# Patient Record
Sex: FEMALE | Race: WHITE | Employment: FULL TIME | URBAN - METROPOLITAN AREA
[De-identification: names, ages, dates, MRNs, and addresses within clinical notes are randomized per-mention and may not be internally consistent; named-entity substitution may affect disease eponyms.]

---

## 2021-02-09 ENCOUNTER — HOSPITAL ENCOUNTER (EMERGENCY)
Age: 31
Discharge: HOME | End: 2021-02-09
Attending: EMERGENCY MEDICINE
Payer: COMMERCIAL

## 2021-02-09 ENCOUNTER — APPOINTMENT (OUTPATIENT)
Dept: CT IMAGING | Age: 31
End: 2021-02-09
Payer: COMMERCIAL

## 2021-02-09 VITALS
HEART RATE: 84 BPM | BODY MASS INDEX: 37.57 KG/M2 | WEIGHT: 199 LBS | SYSTOLIC BLOOD PRESSURE: 125 MMHG | RESPIRATION RATE: 14 BRPM | DIASTOLIC BLOOD PRESSURE: 78 MMHG | TEMPERATURE: 98.5 F | OXYGEN SATURATION: 99 % | HEIGHT: 61 IN

## 2021-02-09 DIAGNOSIS — M51.16 LUMBAR DISC HERNIATION WITH RADICULOPATHY: Primary | ICD-10-CM

## 2021-02-09 PROCEDURE — 6360000002 HC RX W HCPCS: Performed by: EMERGENCY MEDICINE

## 2021-02-09 PROCEDURE — 72131 CT LUMBAR SPINE W/O DYE: CPT

## 2021-02-09 PROCEDURE — 6370000000 HC RX 637 (ALT 250 FOR IP): Performed by: EMERGENCY MEDICINE

## 2021-02-09 PROCEDURE — 96372 THER/PROPH/DIAG INJ SC/IM: CPT

## 2021-02-09 PROCEDURE — 99285 EMERGENCY DEPT VISIT HI MDM: CPT

## 2021-02-09 RX ORDER — ONDANSETRON 2 MG/ML
4 INJECTION INTRAMUSCULAR; INTRAVENOUS ONCE
Status: COMPLETED | OUTPATIENT
Start: 2021-02-09 | End: 2021-02-09

## 2021-02-09 RX ORDER — ALBUTEROL SULFATE 90 UG/1
2 AEROSOL, METERED RESPIRATORY (INHALATION) EVERY 6 HOURS PRN
COMMUNITY
Start: 2020-02-19

## 2021-02-09 RX ORDER — CYCLOBENZAPRINE HCL 10 MG
10 TABLET ORAL ONCE
Status: COMPLETED | OUTPATIENT
Start: 2021-02-09 | End: 2021-02-09

## 2021-02-09 RX ORDER — CYCLOBENZAPRINE HCL 10 MG
10 TABLET ORAL 2 TIMES DAILY PRN
Qty: 20 TABLET | Refills: 0 | Status: SHIPPED | OUTPATIENT
Start: 2021-02-09 | End: 2021-02-19

## 2021-02-09 RX ORDER — METHYLPREDNISOLONE 4 MG/1
TABLET ORAL
Qty: 1 KIT | Refills: 0 | Status: SHIPPED | OUTPATIENT
Start: 2021-02-09

## 2021-02-09 RX ORDER — OXYCODONE HYDROCHLORIDE AND ACETAMINOPHEN 5; 325 MG/1; MG/1
1 TABLET ORAL ONCE
Status: COMPLETED | OUTPATIENT
Start: 2021-02-09 | End: 2021-02-09

## 2021-02-09 RX ORDER — NORGESTIMATE AND ETHINYL ESTRADIOL 7DAYSX3 28
KIT ORAL
COMMUNITY
Start: 2021-02-02

## 2021-02-09 RX ORDER — BUPROPION HYDROCHLORIDE 150 MG/1
TABLET ORAL
COMMUNITY
Start: 2020-03-17

## 2021-02-09 RX ORDER — NAPROXEN 500 MG/1
500 TABLET ORAL 2 TIMES DAILY
Qty: 20 TABLET | Refills: 0 | Status: SHIPPED | OUTPATIENT
Start: 2021-02-09 | End: 2021-02-19

## 2021-02-09 RX ADMIN — ONDANSETRON 4 MG: 2 INJECTION INTRAMUSCULAR; INTRAVENOUS at 13:50

## 2021-02-09 RX ADMIN — HYDROMORPHONE HYDROCHLORIDE 1 MG: 1 INJECTION, SOLUTION INTRAMUSCULAR; INTRAVENOUS; SUBCUTANEOUS at 13:52

## 2021-02-09 RX ADMIN — OXYCODONE AND ACETAMINOPHEN 1 TABLET: 5; 325 TABLET ORAL at 10:57

## 2021-02-09 RX ADMIN — CYCLOBENZAPRINE 10 MG: 10 TABLET, FILM COATED ORAL at 10:57

## 2021-02-09 SDOH — HEALTH STABILITY: MENTAL HEALTH: HOW OFTEN DO YOU HAVE A DRINK CONTAINING ALCOHOL?: NEVER

## 2021-02-09 ASSESSMENT — PAIN DESCRIPTION - LOCATION
LOCATION: BACK

## 2021-02-09 ASSESSMENT — PAIN SCALES - GENERAL
PAINLEVEL_OUTOF10: 7
PAINLEVEL_OUTOF10: 6

## 2021-02-09 ASSESSMENT — PAIN DESCRIPTION - PAIN TYPE
TYPE: ACUTE PAIN
TYPE: ACUTE PAIN

## 2021-02-09 ASSESSMENT — PAIN DESCRIPTION - ORIENTATION: ORIENTATION: LOWER

## 2021-02-09 ASSESSMENT — PAIN - FUNCTIONAL ASSESSMENT: PAIN_FUNCTIONAL_ASSESSMENT: 0-10

## 2021-02-09 NOTE — ED PROVIDER NOTES
eMERGENCY dEPARTMENT eNCOUnter      Pt Name: Rahul Escamilla  MRN: [id number]  [place] 1990  Date of evaluation: 2/9/2021  Provider: Darshana Beck MD     [address] [place] [address]       Chief Complaint   Patient presents with    Back Pain     acute and chronic back pain. Acute started today, coughed and injured back. Did take a bath and was walking to room and passed out, fell onto a recliner. Denies hitting head. Brought in by [facility], was given Fentanyl 100 mcg. HISTORY OF PRESENT ILLNESS   (Location/Symptom, Timing/Onset,Context/Setting, Quality, Duration, Modifying Factors, Severity) Note limiting factors. HPI    Rahul Escamilla is a 35 y.o. female who presents to the emergency department with low back pain. Patient states she has chronic low back pain but today while getting ready for work she coughed and strained her low back and collapsed into the couch. Patient unable to move without increasing pain. Patient did call 911. Patient was given 900 mcg of fentanyl prior to arrival.  Patient is laying on a backboard. Patient denies any numbness. The pain does radiate to the left leg when movement. Does have good sensation of the lower extremities. Patient has history of asthma and depression. There has been no fever. Patient denies any abdominal pain. No nausea or vomiting. Nursing Notes were reviewed. REVIEW OFSYSTEMS    (2+ for level 4; 10+ for level 5)   Review of Systems    General: No fevers, chills or night sweats, No weight loss    Head:  No Sore throat,  No Ear Pain    Chest:  Nontender. No Cough, No SOB,  Chest Pain    GI: No abdominal pain or vomiting    : No dysuria or hematuria    Musculoskeletal: No unrelenting pain or night pain    Neurologic: No bowel or bladder incontinence, No saddle anesthesia, No leg weakness    All other systems reviewed and are negative.         PAST MEDICAL HISTORY     Past Medical History:   Diagnosis Date    Asthma     Depression     and SCREENINGS           PHYSICAL EXAM    (up to 7 for level 4, 8 or more for level 5)     ED Triage Vitals [02/09/21 0959]   BP Temp Temp src Pulse Resp SpO2 Height Weight   134/84 98.5 °F (36.9 °C) -- 94 14 100 % 5' 1\" (1.549 m) 199 lb (90.3 kg)       Physical Exam    General: Alert and awake ×3. Nontoxic appearance. Well-developed well-nourished obese 32-year-old female who is in obvious distress from the pain. HEENT: Normocephalic atraumatic. Neck is supple. Airway intact. No adenopathy  Cardiac: Regular rate and rhythm with no murmurs rubs or gallops  Pulmonary: Lungs are clear in all lung fields. No wheezing. No Rales. Abdomen: Soft and nontender. Negative hepatosplenomegaly. Bowel sounds are active  Extremities: Moving all extremities. No calf tenderness. Peripheral pulses all intact. Positive straight straight leg raises on the left. It was positive at about 15 to 20 degrees off the cot. Sensory exam was normal.  Good pedal pulses throughout. Skin: No skin lesions. No rashes  Neurologic: Cranial nerves II through XII was grossly intact. Nonfocal neurological exam  Psychiatric: Patient is pleasant. Mood is appropriate. DIAGNOSTIC RESULTS     EKG (Per Emergency Physician):       RADIOLOGY (Per Emergency Physician): Interpretation per the Radiologist below, if available at the time of this note:  Ct Lumbar Spine Wo Contrast    Result Date: 2/9/2021  EXAMINATION: CT OF THE LUMBAR SPINE WITHOUT CONTRAST  2/9/2021 TECHNIQUE: CT of the lumbar spine was performed without the administration of intravenous contrast. Multiplanar reformatted images are provided for review. Dose modulation, iterative reconstruction, and/or weight based adjustment of the mA/kV was utilized to reduce the radiation dose to as low as reasonably achievable.  COMPARISON: None HISTORY: ORDERING SYSTEM PROVIDED HISTORY: BACK PAIN TECHNOLOGIST PROVIDED HISTORY: Reason for exam:->acute back pain Decision Support Exception->Emergency Medical Condition (MA) Is the patient pregnant?->No Reason for Exam: PT. STATES THIS MORNING COUGHED AND FELT RADIATING PAIN LT. LOWER BACK, STATES WAS  NOT ABLE TO MOVE Acuity: Acute Type of Exam: Initial Relevant Medical/Surgical History: PT. STATES HAS HX OF NERVE DAMAGE AND SPASMS TO BACK FINDINGS: BONES/ALIGNMENT: Normal vertebral alignment. No fracture. No destructive bone lesion. Normal vertebral body contours DEGENERATIVE CHANGES: No loss of disc space height. Possible left paracentral disc protrusion or extrusion at L4-L5. No facet osteoarthritis SOFT TISSUES/RETROPERITONEUM: No paraspinal hematoma     1. No bony abnormality 2. Possible L4-L5 left paracentral disc protrusion or extrusion, suboptimally evaluated with this modality. Consider MRI of the lumbar spine       ED BEDSIDE ULTRASOUND:   Performed by ED Physician - none    LABS:  Labs Reviewed - No data to display     All other labs were within normal range or not returned as of this dictation. Procedures      EMERGENCY DEPARTMENT COURSE and DIFFERENTIAL DIAGNOSIS/MDM:   Vitals:    Vitals:    02/09/21 0959 02/09/21 1151   BP: 134/84 125/78   Pulse: 94 84   Resp: 14 14   Temp: 98.5 °F (36.9 °C)    SpO2: 100% 99%   Weight: 199 lb (90.3 kg)    Height: 5' 1\" (1.549 m)        Medications   oxyCODONE-acetaminophen (PERCOCET) 5-325 MG per tablet 1 tablet (1 tablet Oral Given 2/9/21 1057)   cyclobenzaprine (FLEXERIL) tablet 10 mg (10 mg Oral Given 2/9/21 1057)   HYDROmorphone (DILAUDID) injection 1 mg (1 mg Intramuscular Given 2/9/21 1352)   ondansetron (ZOFRAN) injection 4 mg (4 mg Intramuscular Given 2/9/21 1350)       MDM. Patient is a 67-year-old with acute onset of back pain in the lumbar area with radiation pain down the left leg. Patient denies any blunt trauma. Patient states she just cough and had severe pain.   Patient has history of back issues in the past.  Has been no fevers no chills no weakness April unable to ambulate was brought in by ambulance. Exam has positive leg raises. Movement makes it worse. Patient looks uncomfortable. CAT scan confirms a L4-L5 herniated disc. Will require MRI as outpatient. Patient given pain medication. Initially with fentanyl by the paramedics and then Percocets patient was then given Flexeril and finally Dilaudid and Zofran has helped. Patient will be placed on Naprosyn Medrol Dosepak and Flexeril at home. Referred to neurosurgery or neurology at [facility] clinic. Patient was given number also follow with family physician. Patient discharged in improved condition. REVAL:         CRITICAL CARE TIME   Total CriticalCare time was 0 minutes, excluding separately reportable procedures. There was a high probability of clinically significant/life threatening deterioration in the patient's condition which required my urgent intervention. CONSULTS:  None    PROCEDURES:  Unless otherwise noted below, none     [unfilled]    FINAL IMPRESSION      1.  Lumbar disc herniation with radiculopathy          DISPOSITION/PLAN   DISPOSITION        PATIENT REFERRED TO:  Colletta Heritage [address]  [address] [facility] [address]  [phone]    Schedule an appointment as soon as possible for a visit in 1 week  If symptoms worsen      DISCHARGE MEDICATIONS:  Discharge Medication List as of 2/9/2021  2:46 PM      START taking these medications    Details   naproxen (NAPROSYN) 500 MG tablet Take 1 tablet by mouth 2 times daily for 20 doses, Disp-20 tablet, R-0Normal      methylPREDNISolone (MEDROL, BENJI,) 4 MG tablet Take by mouth., Disp-1 kit, R-0Normal      cyclobenzaprine (FLEXERIL) 10 MG tablet Take 1 tablet by mouth 2 times daily as needed for Muscle spasms, Disp-20 tablet, R-0Normal                (Please note:  Portions of this note were completed with a voice recognition program.Efforts were made to edit the dictations but occasionally words and phrases are mis-transcribed.)  Form [id number]    LISETTE DIAMOND MD (electronically signed)  Emergency Medicine Provider        Nell Salgado MD  02/09/21 9212